# Patient Record
Sex: FEMALE | Race: WHITE | URBAN - METROPOLITAN AREA
[De-identification: names, ages, dates, MRNs, and addresses within clinical notes are randomized per-mention and may not be internally consistent; named-entity substitution may affect disease eponyms.]

---

## 2021-01-21 ENCOUNTER — OFFICE VISIT (OUTPATIENT)
Dept: URBAN - METROPOLITAN AREA CLINIC 118 | Facility: CLINIC | Age: 76
End: 2021-01-21

## 2021-03-05 ENCOUNTER — OFFICE VISIT (OUTPATIENT)
Dept: URBAN - METROPOLITAN AREA CLINIC 118 | Facility: CLINIC | Age: 76
End: 2021-03-05

## 2021-04-15 ENCOUNTER — DASHBOARD ENCOUNTERS (OUTPATIENT)
Age: 76
End: 2021-04-15

## 2021-04-15 ENCOUNTER — LAB OUTSIDE AN ENCOUNTER (OUTPATIENT)
Dept: URBAN - METROPOLITAN AREA CLINIC 118 | Facility: CLINIC | Age: 76
End: 2021-04-15

## 2021-04-15 ENCOUNTER — OFFICE VISIT (OUTPATIENT)
Dept: URBAN - METROPOLITAN AREA CLINIC 118 | Facility: CLINIC | Age: 76
End: 2021-04-15
Payer: MEDICARE

## 2021-04-15 DIAGNOSIS — D50.8 OTHER IRON DEFICIENCY ANEMIA: ICD-10-CM

## 2021-04-15 PROBLEM — 87522002: Status: ACTIVE | Noted: 2021-04-15

## 2021-04-15 PROCEDURE — 99204 OFFICE O/P NEW MOD 45 MIN: CPT | Performed by: INTERNAL MEDICINE

## 2021-04-15 PROCEDURE — 99244 OFF/OP CNSLTJ NEW/EST MOD 40: CPT | Performed by: INTERNAL MEDICINE

## 2021-04-15 NOTE — HPI-TODAY'S VISIT:
This is a 77 yo female with no significant PMH referred by Dr. Duncan for evaluation for anemia. A copy of the report will be sent to the referring doctor. Patient experienced fatigue and weakness late last year and was found to have anemia with hgb down to 7.7 with MCV 66 range and low iron. She was started on iron supplements. Now she is feeling much better and she also stopped taking iron supplements. She denies any GI complaints including blood in the stools, melena, or change in bowel habits. Last colonoscopy was in 2007 with  Dr. Simon showing long tortous colon with moderate sigmoid diverticulosis. No h/o colon polyps.

## 2021-04-16 LAB
FERRITIN, SERUM: 103
HEMATOCRIT: 40
HEMOGLOBIN: 13.6
IRON BIND.CAP.(TIBC): 348
IRON SATURATION: 22
IRON: 78
MCH: 30.8
MCHC: 34
MCV: 91
NRBC: (no result)
PLATELETS: 277
RBC: 4.42
RDW: 13.5
UIBC: 270
WBC: 5.2

## 2021-04-27 ENCOUNTER — CLAIMS CREATED FROM THE CLAIM WINDOW (OUTPATIENT)
Dept: URBAN - METROPOLITAN AREA CLINIC 4 | Facility: CLINIC | Age: 76
End: 2021-04-27
Payer: MEDICARE

## 2021-04-27 ENCOUNTER — OFFICE VISIT (OUTPATIENT)
Dept: URBAN - METROPOLITAN AREA SURGERY CENTER 23 | Facility: SURGERY CENTER | Age: 76
End: 2021-04-27
Payer: MEDICARE

## 2021-04-27 DIAGNOSIS — K31.89 REACTIVE GASTROPATHY: ICD-10-CM

## 2021-04-27 DIAGNOSIS — D50.9 ANEMIA, IRON DEFICIENCY: ICD-10-CM

## 2021-04-27 DIAGNOSIS — K31.89 ACQUIRED DEFORMITY OF DUODENUM: ICD-10-CM

## 2021-04-27 PROCEDURE — 88312 SPECIAL STAINS GROUP 1: CPT | Performed by: PATHOLOGY

## 2021-04-27 PROCEDURE — G8907 PT DOC NO EVENTS ON DISCHARG: HCPCS | Performed by: INTERNAL MEDICINE

## 2021-04-27 PROCEDURE — 88305 TISSUE EXAM BY PATHOLOGIST: CPT | Performed by: PATHOLOGY

## 2021-04-27 PROCEDURE — 43239 EGD BIOPSY SINGLE/MULTIPLE: CPT | Performed by: INTERNAL MEDICINE
